# Patient Record
Sex: MALE | URBAN - METROPOLITAN AREA
[De-identification: names, ages, dates, MRNs, and addresses within clinical notes are randomized per-mention and may not be internally consistent; named-entity substitution may affect disease eponyms.]

---

## 2024-03-15 ENCOUNTER — APPOINTMENT (RX ONLY)
Dept: URBAN - METROPOLITAN AREA CLINIC 41 | Facility: CLINIC | Age: 24
Setting detail: DERMATOLOGY
End: 2024-03-15

## 2024-03-15 DIAGNOSIS — L65.9 NONSCARRING HAIR LOSS, UNSPECIFIED: ICD-10-CM

## 2024-03-15 PROCEDURE — ? COUNSELING

## 2024-03-15 PROCEDURE — ? TREATMENT REGIMEN

## 2024-03-15 PROCEDURE — 99203 OFFICE O/P NEW LOW 30 MIN: CPT

## 2024-03-15 ASSESSMENT — LOCATION ZONE DERM: LOCATION ZONE: SCALP

## 2024-03-15 ASSESSMENT — LOCATION DETAILED DESCRIPTION DERM: LOCATION DETAILED: POSTERIOR MID-PARIETAL SCALP

## 2024-03-15 ASSESSMENT — LOCATION SIMPLE DESCRIPTION DERM: LOCATION SIMPLE: POSTERIOR SCALP

## 2024-03-15 NOTE — HPI: HAIR LOSS
Previous Labs: No
How Did The Hair Loss Occur?: gradual in onset
How Severe Is Your Hair Loss?: mild
What Hair Products Do You Use?: Hair loss shampoo
Additional History: Patient is here for gradual hair loss with bumps affecting crown of scalp over the last 4 years.

## 2024-03-15 NOTE — PROCEDURE: TREATMENT REGIMEN
Otc Regimen: minoxodil 5% solution - twice daily
Plan: Discussed genetic and hormonal component to it. Discussed how hair transplants work. Recommended using minoxidil/Rogaine twice daily every day as a leave on treatment. Discussed finasteride/dutasteride and oral minoxidil - potential side effects of these medications were discussed in detail; sexual side effects, tachycardia, heart issues (LVH).  Recommended referral to pcp for baseline EKG if minoxidil therapy desired. Also discussed need for serial BP and heart rate monitoring.
Detail Level: Zone